# Patient Record
Sex: MALE | Race: WHITE | NOT HISPANIC OR LATINO | Employment: FULL TIME | URBAN - METROPOLITAN AREA
[De-identification: names, ages, dates, MRNs, and addresses within clinical notes are randomized per-mention and may not be internally consistent; named-entity substitution may affect disease eponyms.]

---

## 2018-12-17 ENCOUNTER — OFFICE VISIT (OUTPATIENT)
Dept: URGENT CARE | Facility: CLINIC | Age: 23
End: 2018-12-17
Payer: COMMERCIAL

## 2018-12-17 VITALS
HEART RATE: 60 BPM | DIASTOLIC BLOOD PRESSURE: 70 MMHG | SYSTOLIC BLOOD PRESSURE: 119 MMHG | WEIGHT: 120 LBS | TEMPERATURE: 98 F | RESPIRATION RATE: 18 BRPM | OXYGEN SATURATION: 99 % | BODY MASS INDEX: 19.29 KG/M2 | HEIGHT: 66 IN
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
## 2018-12-17 PROCEDURE — 90715 TDAP VACCINE 7 YRS/> IM: CPT | Mod: S$GLB,,, | Performed by: PHYSICIAN ASSISTANT

## 2018-12-17 PROCEDURE — 90471 IMMUNIZATION ADMIN: CPT | Mod: S$GLB,,, | Performed by: PHYSICIAN ASSISTANT

## 2018-12-17 PROCEDURE — 99204 OFFICE O/P NEW MOD 45 MIN: CPT | Mod: 25,S$GLB,, | Performed by: PHYSICIAN ASSISTANT

## 2018-12-17 RX ORDER — AMOXICILLIN AND CLAVULANATE POTASSIUM 875; 125 MG/1; MG/1
1 TABLET, FILM COATED ORAL 2 TIMES DAILY
Qty: 20 TABLET | Refills: 0 | Status: SHIPPED | OUTPATIENT
Start: 2018-12-17 | End: 2018-12-27

## 2018-12-17 RX ORDER — MUPIROCIN 20 MG/G
OINTMENT TOPICAL
Qty: 22 G | Refills: 1 | Status: SHIPPED | OUTPATIENT
Start: 2018-12-17 | End: 2020-11-30

## 2018-12-18 NOTE — PATIENT INSTRUCTIONS
· Follow up with your primary care in 2-5 days if symptoms have not improved, or you may return here.  · If you were referred to a specialist, please follow up with that specialty.  · If you were prescribed antibiotics, please take them to completion.  · If you were prescribed a narcotic or any medication with sedative effects, do not drive or operate heavy equipment or machinery while taking these medications.  · You must understand that you have received treatment at an Urgent Care facility only, and that you may be released before all of your medical problems are known or treated. Urgent Care facilities are not equipped to handle life threatening emergencies. It is recommended that you go to an Emergency Department for further evaluation of worsening or concerning symptoms, or possibly life threatening conditions as discussed.                                        If you  smoke, please stop smoking        Cat Bite    A cat bite can cause a wound deep enough to break the skin. In such cases, the wound is cleaned and then sometimes closed. If the wound is closed it is usually not closed completely. This is so that fluid can drain if the wound becomes infected. Often the wound is left open to heal. In addition to wound care, a tetanus shot may be given, if needed.  Home care  · Wash your hands well with soap and warm water before and after caring for the wound. This helps lower the risk of infection.  · Care for the wound as directed. If a dressing was applied to the wound, be sure to change it as directed.  · If the wound bleeds, place a clean, soft cloth on the wound. Then firmly apply pressure until the bleeding stops. This may take up to 5 minutes. Do not release the pressure and look at the wound during this time.  · Always get medical attention for cat bites on the hand. They are highly likely to become infected.  · Most wounds heal within 10 days. But an infection can occur even with proper treatment. So be  sure to check the wound daily for signs of infection (see below).  · Antibiotics may be prescribed. These help prevent or treat infection. If youre given antibiotics, take them as directed. Also be sure to complete the medicines.  Rabies prevention  Rabies is a virus that can be carried in certain animals. These can include domestic animals such as cats and dogs. Pets fully vaccinated against rabies (2 shots) are at very low risk of infection. But because human rabies is almost always fatal, any biting pet should be confined for 10 days as an extra precaution. In general, if there is a risk for rabies, the following steps may need to be taken:  · If someones pet cat has bitten you, it should be kept in a secure area for the next 10 days to watch for signs of illness. (If the pet owner wont allow this, contact your local animal control center.) If the cat becomes ill or dies during that time, contact your local animal control center at once so the animal may be tested for rabies. If the cat stays healthy for the next 10 days, there is no danger of rabies in the animal or you.  · If a stray cat bit you, contact your local animal control center. They can give information on capture, quarantine, and animal rabies testing.  · If you cant find the animal that bit you in the next 2 days, and if rabies exists in your area, you may need to receive the rabies vaccine series. Call your healthcare provider right away. Or return to the emergency department promptly.  · All animal bites should be reported to the local animal control center. If you were not given a form to fill out, you can report this yourself.  Follow-up care  Follow up with your healthcare provider, or as directed.  When to seek medical advice  Call your healthcare provider right away if any of these occur:  · Signs of infection:  ¨ Spreading redness or warmth from the wound  ¨ Increased pain or swelling  ¨ Fever of 100.4ºF (38ºC) or higher, or as directed  by your healthcare provider  ¨ Colored fluid or pus draining from the wound  ¨ Enlarged lymph nodes above the area that was bitten, such as lymph nodes in the armpit if you were bitten on the hand or arm. This may be a sign of cat-scratch disease (cat-scratch fever).  · Signs of rabies infection:  ¨ Headache  ¨ Confusion  ¨ Strange behavior  ¨ Increased salivating or drooling  ¨ Seizure  · Decreased ability to move any body part near the bite area  · Bleeding that can't be stopped after 5 minutes of firm pressure  Date Last Reviewed: 3/23/2015  © 6194-6937 [employer]. [address] [place] [address], [place] [address]. All rights reserved. This information is not intended as a substitute for professional medical care. Always follow your healthcare professional's instructions.

## 2018-12-18 NOTE — PROGRESS NOTES
"Subjective:       Patient ID: Burton Alvarado is a 23 y.o. male.    Vitals:  height is 5' 6" (1.676 m) and weight is 54.4 kg (120 lb). His oral temperature is 98 °F (36.7 °C). His blood pressure is 119/70 and his pulse is 60. His respiration is 18 and oxygen saturation is 99%.     Chief Complaint: Animal Bite (right hand)    Patient was bitten by a stray cat (R hand) while at work.      Animal Bite    The incident occurred today. The incident occurred at work. He came to the ER via personal transport. There is an injury to the right hand. There is an injury to the right little finger. The pain is mild. It is unlikely that a foreign body is present. There have been no prior injuries to these areas. He is right-handed. His tetanus status is unknown. He has been behaving normally.      <OUCOOHADULT>    Objective:      Physical Exam   Constitutional: He is oriented to person, place, and time. He appears well-developed and well-nourished. He is cooperative.  Non-toxic appearance. He does not appear ill. No distress.   HENT:   Head: Normocephalic and atraumatic.   Right Ear: Hearing, tympanic membrane, external ear and ear canal normal.   Left Ear: Hearing, tympanic membrane, external ear and ear canal normal.   Nose: Nose normal. No mucosal edema, rhinorrhea or nasal deformity. No epistaxis. Right sinus exhibits no maxillary sinus tenderness and no frontal sinus tenderness. Left sinus exhibits no maxillary sinus tenderness and no frontal sinus tenderness.   Mouth/Throat: Uvula is midline, oropharynx is clear and moist and mucous membranes are normal. No trismus in the jaw. Normal dentition. No uvula swelling. No posterior oropharyngeal erythema.   Eyes: Conjunctivae and lids are normal. Right eye exhibits no discharge. Left eye exhibits no discharge. No scleral icterus.   Sclera clear bilat   Neck: Trachea normal, normal range of motion, full passive range of motion without pain and phonation normal. Neck supple. "   Cardiovascular: Normal rate, regular rhythm, normal heart sounds, intact distal pulses and normal pulses.   Pulmonary/Chest: Effort normal and breath sounds normal. No respiratory distress.   Abdominal: Soft. Normal appearance and bowel sounds are normal. He exhibits no distension, no pulsatile midline mass and no mass. There is no tenderness.   Musculoskeletal: Normal range of motion. He exhibits no edema or deformity.        Right hand: He exhibits tenderness. He exhibits normal capillary refill, no laceration and no swelling. Normal sensation noted.        Hands:  Neurological: He is alert and oriented to person, place, and time. He exhibits normal muscle tone. Coordination normal.   Skin: Skin is warm, dry and intact. He is not diaphoretic. No pallor.   Psychiatric: He has a normal mood and affect. His speech is normal and behavior is normal. Judgment and thought content normal. Cognition and memory are normal.   Nursing note and vitals reviewed.      Assessment:       1. Cat bite, initial encounter        Plan:       - Animal control contacted    Cat bite, initial encounter  -     mupirocin (BACTROBAN) 2 % ointment; Apply to affected area 2 times daily  Dispense: 22 g; Refill: 1  -     amoxicillin-clavulanate 875-125mg (AUGMENTIN) 875-125 mg per tablet; Take 1 tablet by mouth 2 (two) times daily. for 10 days  Dispense: 20 tablet; Refill: 0  -     (In Office Administered) Tdap Vaccine      Patient Instructions   · Follow up with your primary care in 2-5 days if symptoms have not improved, or you may return here.  · If you were referred to a specialist, please follow up with that specialty.  · If you were prescribed antibiotics, please take them to completion.  · If you were prescribed a narcotic or any medication with sedative effects, do not drive or operate heavy equipment or machinery while taking these medications.  · You must understand that you have received treatment at an Urgent Care facility only, and  that you may be released before all of your medical problems are known or treated. Urgent Care facilities are not equipped to handle life threatening emergencies. It is recommended that you go to an Emergency Department for further evaluation of worsening or concerning symptoms, or possibly life threatening conditions as discussed.                                        If you  smoke, please stop smoking        Cat Bite    A cat bite can cause a wound deep enough to break the skin. In such cases, the wound is cleaned and then sometimes closed. If the wound is closed it is usually not closed completely. This is so that fluid can drain if the wound becomes infected. Often the wound is left open to heal. In addition to wound care, a tetanus shot may be given, if needed.  Home care  · Wash your hands well with soap and warm water before and after caring for the wound. This helps lower the risk of infection.  · Care for the wound as directed. If a dressing was applied to the wound, be sure to change it as directed.  · If the wound bleeds, place a clean, soft cloth on the wound. Then firmly apply pressure until the bleeding stops. This may take up to 5 minutes. Do not release the pressure and look at the wound during this time.  · Always get medical attention for cat bites on the hand. They are highly likely to become infected.  · Most wounds heal within 10 days. But an infection can occur even with proper treatment. So be sure to check the wound daily for signs of infection (see below).  · Antibiotics may be prescribed. These help prevent or treat infection. If youre given antibiotics, take them as directed. Also be sure to complete the medicines.  Rabies prevention  Rabies is a virus that can be carried in certain animals. These can include domestic animals such as cats and dogs. Pets fully vaccinated against rabies (2 shots) are at very low risk of infection. But because human rabies is almost always fatal, any biting  pet should be confined for 10 days as an extra precaution. In general, if there is a risk for rabies, the following steps may need to be taken:  · If someones pet cat has bitten you, it should be kept in a secure area for the next 10 days to watch for signs of illness. (If the pet owner wont allow this, contact your local animal control center.) If the cat becomes ill or dies during that time, contact your local animal control center at once so the animal may be tested for rabies. If the cat stays healthy for the next 10 days, there is no danger of rabies in the animal or you.  · If a stray cat bit you, contact your local animal control center. They can give information on capture, quarantine, and animal rabies testing.  · If you cant find the animal that bit you in the next 2 days, and if rabies exists in your area, you may need to receive the rabies vaccine series. Call your healthcare provider right away. Or return to the emergency department promptly.  · All animal bites should be reported to the local animal control center. If you were not given a form to fill out, you can report this yourself.  Follow-up care  Follow up with your healthcare provider, or as directed.  When to seek medical advice  Call your healthcare provider right away if any of these occur:  · Signs of infection:  ¨ Spreading redness or warmth from the wound  ¨ Increased pain or swelling  ¨ Fever of 100.4ºF (38ºC) or higher, or as directed by your healthcare provider  ¨ Colored fluid or pus draining from the wound  ¨ Enlarged lymph nodes above the area that was bitten, such as lymph nodes in the armpit if you were bitten on the hand or arm. This may be a sign of cat-scratch disease (cat-scratch fever).  · Signs of rabies infection:  ¨ Headache  ¨ Confusion  ¨ Strange behavior  ¨ Increased salivating or drooling  ¨ Seizure  · Decreased ability to move any body part near the bite area  · Bleeding that can't be stopped after 5 minutes of firm  pressure  Date Last Reviewed: 3/23/2015  © 8996-2331 [employer]. [address] [place] [address], [place] [address]. All rights reserved. This information is not intended as a substitute for professional medical care. Always follow your healthcare professional's instructions.

## 2020-11-19 ENCOUNTER — OFFICE VISIT (OUTPATIENT)
Dept: URGENT CARE | Facility: CLINIC | Age: 25
End: 2020-11-19
Payer: OTHER MISCELLANEOUS

## 2020-11-19 VITALS
HEART RATE: 88 BPM | SYSTOLIC BLOOD PRESSURE: 132 MMHG | RESPIRATION RATE: 17 BRPM | DIASTOLIC BLOOD PRESSURE: 86 MMHG | WEIGHT: 130 LBS | HEIGHT: 66 IN | TEMPERATURE: 99 F | OXYGEN SATURATION: 99 % | BODY MASS INDEX: 20.89 KG/M2

## 2020-11-19 DIAGNOSIS — M79.651 RIGHT THIGH PAIN: ICD-10-CM
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
## 2020-11-19 DIAGNOSIS — M25.552 LEFT HIP PAIN: ICD-10-CM

## 2020-11-19 DIAGNOSIS — S32.591A CLOSED FRACTURE OF RAMUS OF RIGHT PUBIS, INITIAL ENCOUNTER: Primary | ICD-10-CM

## 2020-11-19 LAB
CTP QC/QA: YES
POC 5 PANEL DRUG SCREEN: NEGATIVE

## 2020-11-19 PROCEDURE — 80305 DRUG TEST PRSMV DIR OPT OBS: CPT | Mod: QW,S$GLB,, | Performed by: NURSE PRACTITIONER

## 2020-11-19 PROCEDURE — 99214 PR OFFICE/OUTPT VISIT, EST, LEVL IV, 30-39 MIN: ICD-10-PCS | Mod: S$GLB,,, | Performed by: NURSE PRACTITIONER

## 2020-11-19 PROCEDURE — 73552 X-RAY EXAM OF FEMUR 2/>: CPT | Mod: RT,S$GLB,, | Performed by: RADIOLOGY

## 2020-11-19 PROCEDURE — 73552 XR FEMUR 2 VIEW RIGHT: ICD-10-PCS | Mod: RT,S$GLB,, | Performed by: RADIOLOGY

## 2020-11-19 PROCEDURE — 99214 OFFICE O/P EST MOD 30 MIN: CPT | Mod: S$GLB,,, | Performed by: NURSE PRACTITIONER

## 2020-11-19 PROCEDURE — 73502 XR HIP 2 VIEW LEFT: ICD-10-PCS | Mod: LT,S$GLB,, | Performed by: RADIOLOGY

## 2020-11-19 PROCEDURE — 73502 X-RAY EXAM HIP UNI 2-3 VIEWS: CPT | Mod: LT,S$GLB,, | Performed by: RADIOLOGY

## 2020-11-19 PROCEDURE — 80305 POCT RAPID DRUG SCREEN 5 PANEL: ICD-10-PCS | Mod: QW,S$GLB,, | Performed by: NURSE PRACTITIONER

## 2020-11-19 RX ORDER — IBUPROFEN 800 MG/1
800 TABLET ORAL 3 TIMES DAILY
Qty: 15 TABLET | Refills: 0 | Status: SHIPPED | OUTPATIENT
Start: 2020-11-19 | End: 2020-11-24

## 2020-11-19 NOTE — PROGRESS NOTES
"Subjective:       Patient ID: Burton Alvarado is a 25 y.o. male.    Vitals:  height is 5' 6" (1.676 m) and weight is 59 kg (130 lb). His temperature is 98.7 °F (37.1 °C). His blood pressure is 132/86 and his pulse is 88. His respiration is 17 and oxygen saturation is 99%.     Chief Complaint: Injury    Patient reports he fell off a scaffold about 10-15ft in the air. Pt reports he fell onto his left hip, denies head injury and LOC. Pt also reports after falling to the cemented ground, the scaffold fell onto his right thigh.     Injury  This is a new problem. The current episode started today. The problem occurs constantly. The problem has been rapidly worsening. Associated symptoms include arthralgias and joint swelling. Pertinent negatives include no abdominal pain, chills, diaphoresis, fatigue, headaches, neck pain or vertigo. The symptoms are aggravated by walking and bending. He has tried NSAIDs for the symptoms. The treatment provided mild relief.       Constitution: Negative for activity change, appetite change, chills, sweating and fatigue.   HENT: Negative for facial swelling and facial trauma.    Neck: Negative for neck pain and neck stiffness.   Cardiovascular: Negative for chest trauma.   Eyes: Negative for eye trauma, double vision and blurred vision.   Gastrointestinal: Negative for abdominal trauma, abdominal pain and rectal bleeding.   Genitourinary: Negative for hematuria, genital trauma and pelvic pain.   Musculoskeletal: Positive for pain (right hip and leg ), trauma, joint pain, joint swelling, abnormal ROM of joint and pain with walking. Negative for arthritis and gout.   Skin: Negative for color change, wound, abrasion and laceration.   Allergic/Immunologic: Negative for immunizations up-to-date and flu shot.   Neurological: Negative for dizziness, history of vertigo, light-headedness, passing out, facial drooping, speech difficulty, coordination disturbances, loss of balance, headaches, history " of migraines, disorientation, altered mental status and loss of consciousness.   Hematologic/Lymphatic: Negative for history of bleeding disorder.   Psychiatric/Behavioral: Negative for altered mental status and disorientation.       Objective:      Physical Exam   Constitutional:  Non-toxic appearance. He does not appear ill. No distress.   HENT:   Head: Normocephalic.   Ears:   Right Ear: Tympanic membrane, external ear and ear canal normal.   Left Ear: Tympanic membrane, external ear and ear canal normal.   Nose: Nose normal.   Mouth/Throat: No posterior oropharyngeal erythema.   Eyes: No scleral icterus.   Neck: Normal range of motion. No neck rigidity.   Cardiovascular: Normal rate, regular rhythm, normal heart sounds and normal pulses.   Pulmonary/Chest: Effort normal and breath sounds normal. No accessory muscle usage. No respiratory distress. He has no decreased breath sounds. He has no wheezes. He has no rhonchi.   Abdominal: Normal appearance and bowel sounds are normal. He exhibits no distension. There is no abdominal tenderness. There is no guarding, no left CVA tenderness and no right CVA tenderness. flat abdomen  Musculoskeletal:         General: Swelling and tenderness present.      Left hip: He exhibits tenderness. He exhibits no bony tenderness.        Legs:    Lymphadenopathy:     He has no cervical adenopathy.   Neurological: He is alert. He displays no weakness. Gait (pt ambulating slowly) abnormal.   Skin: Skin is not diaphoretic. Capillary refill takes less than 2 seconds. Lesions:  bruisingPsychiatric: His behavior is normal. Judgment and thought content normal.   Nursing note and vitals reviewed.        Assessment:       1. Closed fracture of ramus of right pubis, initial encounter    2. Left hip pain    3. Right thigh pain    4. Fall, initial encounter        Plan:         Closed fracture of ramus of right pubis, initial encounter  -     Ambulatory referral/consult to Orthopedics  -      ibuprofen (ADVIL,MOTRIN) 800 MG tablet; Take 1 tablet (800 mg total) by mouth 3 (three) times daily. for 5 days  Dispense: 15 tablet; Refill: 0    Left hip pain  -     XR HIP 2 VIEW LEFT; Future; Expected date: 11/19/2020  -     Ambulatory referral/consult to Occupational Medicine  -     ibuprofen (ADVIL,MOTRIN) 800 MG tablet; Take 1 tablet (800 mg total) by mouth 3 (three) times daily. for 5 days  Dispense: 15 tablet; Refill: 0    Right thigh pain  -     XR FEMUR 2 VIEW RIGHT; Future; Expected date: 11/19/2020  -     Ambulatory referral/consult to Occupational Medicine  -     ibuprofen (ADVIL,MOTRIN) 800 MG tablet; Take 1 tablet (800 mg total) by mouth 3 (three) times daily. for 5 days  Dispense: 15 tablet; Refill: 0    Fall, initial encounter  -     XR HIP 2 VIEW LEFT; Future; Expected date: 11/19/2020  -     XR FEMUR 2 VIEW RIGHT; Future; Expected date: 11/19/2020  -     POCT Rapid Drug Screen 5 Panel  -     Ambulatory referral/consult to Occupational Medicine  -     Ambulatory referral/consult to Orthopedics  -     ibuprofen (ADVIL,MOTRIN) 800 MG tablet; Take 1 tablet (800 mg total) by mouth 3 (three) times daily. for 5 days  Dispense: 15 tablet; Refill: 0    Xr Hip 2 View Left    Result Date: 11/19/2020  EXAMINATION: XR HIP 2 VIEW LEFT CLINICAL HISTORY: Pain in left hip TECHNIQUE: AP view of the pelvis and frog leg lateral view of the left hip were performed. COMPARISON: None FINDINGS: There is subtle cortical irregularity which may represent acute nondisplaced fracture is seen involving the right superior pubic ramus.  Correlate for trauma and tenderness in this region.  No additional acute fractures or dislocation seen.  Joint spaces are maintained.     Suspected acute fracture of the right superior pubic ramus. Electronically signed by: Janusz Valverde MD Date:    11/19/2020 Time:    18:21    Xr Femur 2 View Right    Result Date: 11/19/2020  EXAMINATION: XR FEMUR 2 VIEW RIGHT CLINICAL HISTORY: Pain in right  thigh TECHNIQUE: AP and lateral views of the right femur were performed. COMPARISON: Concurrent pelvic and left hip radiographs. FINDINGS: No evidence of acute displaced fracture or dislocation involving the right femur.  Questionable fracture of the right superior pubic ramus better appreciated on concurrent pelvic/left hip radiograph.     See above. Electronically signed by: Janusz Valverde MD Date:    11/19/2020 Time:    18:22        Patient Instructions   1.  Take all medications as directed. If you have been prescribed antibiotics, make sure to complete them.   2.  Rest and keep yourself/patient well hydrated. For adults, it is recommended to drink at least 8-10 glasses of water daily.   3.  For patients above 6 months of age who are not allergic to and are not on anticoagulants, you can alternate Tylenol and Motrin every 4-6 hours for fever above 100.4F and/or pain.  For patients less than 6 months of age, allergic to or intolerant to NSAIDS, have gastritis, gastric ulcers, or history of GI bleeds, are pregnant, or are on anticoagulant therapy, you can take Tylenol every 4 hours as needed for fever above 100.4F and/or pain.   4. You should schedule a follow-up appointment with your Primary Care Provider/Pediatrician for recheck in 2-3 days or as directed at this visit.   5.  If your condition fails to improve in a timely manner, you should receive another evaluation by your Primary Care Provider/Pediatrician to discuss your concerns or return to urgent care for a recheck.  If your condition worsens at any time, you should report immediately to your nearest Emergency Department for further evaluation. **You must understand that you have received Urgent Care treatment only and that you may be released before all of your medical problems are known or treated. You, the patient, are responsible to arrange for follow-up care as instructed.         Pelvic Fracture  You have a break or fracture of the pelvic bone. Your  fracture is stable because the bones are not out of place and there are no signs of serious internal bleeding. No surgery or other special treatment will be needed. As long as your pain is controlled by oral medicine, you can be treated at home. A broken pelvis will take about 6 to 8 weeks to heal. It can be painful to move for the first 3 to 4 weeks.     Home care  · Bed rest and pain medicine is the only treatment required. Stay in bed for the first 2 to 3 days to reduce pain with movement. During this time, you will need help bathing, using the bathroom, and meals. A bedpan or bedside commode may be easier to use than getting up to use the bathroom. As soon as possible, begin sitting or walking to avoid problems with prolonged bed rest (muscle weakness, worsening back stiffness and pain, blood clots in the legs). A walker, crutches, or cane will make walking easier in the first 3 weeks.  · Home healthcare may be available to provide in-home nursing services. Check with your healthcare provider, the [facility] social service department or a private nursing agency to see if your insurance will cover this kind of care.  · During the first 2 days after the injury there will probably be localized swelling and bruising on the skin over the pelvis. During this time apply an ice pack to the painful area for no more than 20 minutes every 1 to 2 hours to reduce swelling and pain. Wrap the ice pack in a thin towel. Never put ice or an ice pack directly on your skin.  · You may use over-the-counter pain medicine to control pain, unless another medicine was prescribed. Take pain medicine as directed. Call your healthcare provider if your pain is not well-controlled. A dose change or stronger medicine may be needed. If you have chronic liver or kidney disease, talk with your healthcare provider before using pain medicine.  Follow-up care  Follow up with your healthcare provider, or as advised. This will help to make sure the  bone is healing properly.  If X-rays were taken, you will be told of any new findings that may affect your care.  Call 911  Call 911 if you have:  · Swelling, pain or redness below your knee  · Chest pain or shortness of breath  When to seek medical advice  Call your healthcare provider right away if any of these occur:  · Pain becomes worse or you are unable to walk with help for more than three days  · Blood in your urine or bleeding from the urethra (the opening where urine comes out)  · Difficulty passing urine or unable to pass stool due to pain  · Fever of 100.4°F (38°C) or above lasting for 24 to 48 hours  Date Last Reviewed: 12/3/2015  © 8570-6258 [employer]. [address] [place] [address], [place] [address]. All rights reserved. This information is not intended as a substitute for professional medical care. Always follow your healthcare professional's instructions.

## 2020-11-20 ENCOUNTER — OFFICE VISIT (OUTPATIENT)
Dept: URGENT CARE | Facility: CLINIC | Age: 25
End: 2020-11-20
Payer: OTHER MISCELLANEOUS

## 2020-11-20 DIAGNOSIS — S32.511A CLOSED FRACTURE OF SUPERIOR RAMUS OF RIGHT PUBIS, INITIAL ENCOUNTER: Primary | ICD-10-CM

## 2020-11-20 DIAGNOSIS — R10.2 PELVIC PAIN: ICD-10-CM

## 2020-11-20 DIAGNOSIS — S70.11XA CONTUSION OF RIGHT THIGH, INITIAL ENCOUNTER: ICD-10-CM

## 2020-11-20 DIAGNOSIS — Z72.0 TOBACCO USE: ICD-10-CM
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
## 2020-11-20 PROCEDURE — 99214 OFFICE O/P EST MOD 30 MIN: CPT | Mod: S$GLB,,, | Performed by: EMERGENCY MEDICINE

## 2020-11-20 PROCEDURE — 99214 PR OFFICE/OUTPT VISIT, EST, LEVL IV, 30-39 MIN: ICD-10-PCS | Mod: S$GLB,,, | Performed by: EMERGENCY MEDICINE

## 2020-11-20 RX ORDER — HYDROCODONE BITARTRATE AND ACETAMINOPHEN 5; 325 MG/1; MG/1
1 TABLET ORAL EVERY 8 HOURS PRN
Qty: 21 TABLET | Refills: 0 | Status: SHIPPED | OUTPATIENT
Start: 2020-11-20 | End: 2020-11-27

## 2020-11-20 NOTE — LETTER
[facility]  [address] [address]  Phone: [phone]  Fax: [phone]  [facility]: [id number]    Pt Name: Burton Alvarado  Injury Date: 11/19/2020   Employee ID: [id number] Date of First Treatment: 11/20/2020   Company: [employer]      Appointment Time: 01:25 PM Arrived: 1:10 PM   Provider: Sony Bonilla MD Time Out: 3:10 PM     Office Treatment:     1. Closed fracture of superior ramus of right pubis, initial encounter    2. Pelvic pain    3. Contusion of right thigh, initial encounter    4. Tobacco use    5. Fall, initial encounter    6. Work related injury      Medications Ordered This Encounter   Medications    HYDROcodone-acetaminophen (NORCO) 5-325 mg per tablet      Patient Instructions: Attention not to aggravate affected area, Apply ice 24-48 hours then apply heat/warm soaks, Use Crutches as directed    Restrictions: Disabled until next office visit     Return Appointment: 11/27/2020 at 9:30 AM  JOSHUA

## 2020-11-20 NOTE — PATIENT INSTRUCTIONS
Pelvic Fracture  You have a break or fracture of the pelvic bone. Your fracture is stable because the bones are not out of place and there are no signs of serious internal bleeding. No surgery or other special treatment will be needed. As long as your pain is controlled by oral medicine, you can be treated at home. A broken pelvis will take about 6 to 8 weeks to heal. It can be painful to move for the first 3 to 4 weeks.     Home care  · Bed rest and pain medicine is the only treatment required. Stay in bed for the first 2 to 3 days to reduce pain with movement. During this time, you will need help bathing, using the bathroom, and meals. A bedpan or bedside commode may be easier to use than getting up to use the bathroom. As soon as possible, begin sitting or walking to avoid problems with prolonged bed rest (muscle weakness, worsening back stiffness and pain, blood clots in the legs). A walker, crutches, or cane will make walking easier in the first 3 weeks.  · Home healthcare may be available to provide in-home nursing services. Check with your healthcare provider, the [facility] social service department or a private nursing agency to see if your insurance will cover this kind of care.  · During the first 2 days after the injury there will probably be localized swelling and bruising on the skin over the pelvis. During this time apply an ice pack to the painful area for no more than 20 minutes every 1 to 2 hours to reduce swelling and pain. Wrap the ice pack in a thin towel. Never put ice or an ice pack directly on your skin.  · You may use over-the-counter pain medicine to control pain, unless another medicine was prescribed. Take pain medicine as directed. Call your healthcare provider if your pain is not well-controlled. A dose change or stronger medicine may be needed. If you have chronic liver or kidney disease, talk with your healthcare provider before using pain medicine.  Follow-up care  Follow up with  your healthcare provider, or as advised. This will help to make sure the bone is healing properly.  If X-rays were taken, you will be told of any new findings that may affect your care.  Call 911  Call 911 if you have:  · Swelling, pain or redness below your knee  · Chest pain or shortness of breath  When to seek medical advice  Call your healthcare provider right away if any of these occur:  · Pain becomes worse or you are unable to walk with help for more than three days  · Blood in your urine or bleeding from the urethra (the opening where urine comes out)  · Difficulty passing urine or unable to pass stool due to pain  · Fever of 100.4°F (38°C) or above lasting for 24 to 48 hours  Date Last Reviewed: 12/3/2015  © 6579-0776 The "[employer]. [address] [place] [address], [place] [address]. All rights reserved. This information is not intended as a substitute for professional medical care. Always follow your healthcare professional's instructions.

## 2020-11-20 NOTE — PATIENT INSTRUCTIONS
1.  Take all medications as directed. If you have been prescribed antibiotics, make sure to complete them.   2.  Rest and keep yourself/patient well hydrated. For adults, it is recommended to drink at least 8-10 glasses of water daily.   3.  For patients above 6 months of age who are not allergic to and are not on anticoagulants, you can alternate Tylenol and Motrin every 4-6 hours for fever above 100.4F and/or pain.  For patients less than 6 months of age, allergic to or intolerant to NSAIDS, have gastritis, gastric ulcers, or history of GI bleeds, are pregnant, or are on anticoagulant therapy, you can take Tylenol every 4 hours as needed for fever above 100.4F and/or pain.   4. You should schedule a follow-up appointment with your Primary Care Provider/Pediatrician for recheck in 2-3 days or as directed at this visit.   5.  If your condition fails to improve in a timely manner, you should receive another evaluation by your Primary Care Provider/Pediatrician to discuss your concerns or return to urgent care for a recheck.  If your condition worsens at any time, you should report immediately to your nearest Emergency Department for further evaluation. **You must understand that you have received Urgent Care treatment only and that you may be released before all of your medical problems are known or treated. You, the patient, are responsible to arrange for follow-up care as instructed.         Pelvic Fracture  You have a break or fracture of the pelvic bone. Your fracture is stable because the bones are not out of place and there are no signs of serious internal bleeding. No surgery or other special treatment will be needed. As long as your pain is controlled by oral medicine, you can be treated at home. A broken pelvis will take about 6 to 8 weeks to heal. It can be painful to move for the first 3 to 4 weeks.     Home care  · Bed rest and pain medicine is the only treatment required. Stay in bed for the first 2 to 3  days to reduce pain with movement. During this time, you will need help bathing, using the bathroom, and meals. A bedpan or bedside commode may be easier to use than getting up to use the bathroom. As soon as possible, begin sitting or walking to avoid problems with prolonged bed rest (muscle weakness, worsening back stiffness and pain, blood clots in the legs). A walker, crutches, or cane will make walking easier in the first 3 weeks.  · Home healthcare may be available to provide in-home nursing services. Check with your healthcare provider, the [facility] social service department or a private nursing agency to see if your insurance will cover this kind of care.  · During the first 2 days after the injury there will probably be localized swelling and bruising on the skin over the pelvis. During this time apply an ice pack to the painful area for no more than 20 minutes every 1 to 2 hours to reduce swelling and pain. Wrap the ice pack in a thin towel. Never put ice or an ice pack directly on your skin.  · You may use over-the-counter pain medicine to control pain, unless another medicine was prescribed. Take pain medicine as directed. Call your healthcare provider if your pain is not well-controlled. A dose change or stronger medicine may be needed. If you have chronic liver or kidney disease, talk with your healthcare provider before using pain medicine.  Follow-up care  Follow up with your healthcare provider, or as advised. This will help to make sure the bone is healing properly.  If X-rays were taken, you will be told of any new findings that may affect your care.  Call 911  Call 911 if you have:  · Swelling, pain or redness below your knee  · Chest pain or shortness of breath  When to seek medical advice  Call your healthcare provider right away if any of these occur:  · Pain becomes worse or you are unable to walk with help for more than three days  · Blood in your urine or bleeding from the urethra (the  opening where urine comes out)  · Difficulty passing urine or unable to pass stool due to pain  · Fever of 100.4°F (38°C) or above lasting for 24 to 48 hours  Date Last Reviewed: 12/3/2015  © 4150-6560 [employer] [address] [place] [address], [place] [address]. All rights reserved. This information is not intended as a substitute for professional medical care. Always follow your healthcare professional's instructions.

## 2020-11-20 NOTE — PROGRESS NOTES
Subjective:       Patient ID: Burton Alvarado is a 25 y.o. male.    Chief Complaint: Hip Injury    Patient works for [employer] as a . Pt states he was at work on 11/19/2020 working on a roof when he fell off a scaffold about 10-15ft in the air. Pt reports he fell onto his LEFT SIDE and the scaffold felt  onto his RT THIGH. He denies any head injury. Pt also reports after the injury he was seen at [facility] where he received x-rays and medication. His pain today is a 7/10 and the pain is mainly on his RT THIGH. ij     I have reviewed the medical record and discussed the history of injury with the patient.  X-ray of the right femur is negative for fracture and x-ray of the hip bilaterally is negative however there is a nondisplaced right-sided superior pubic ramus fracture.  No bowel or bladder incontinence no retention, no weakness in the legs, patient is pain-free when at rest however and severe pain with axial loading or ambulation.  I have discussed at length with the patient that this the vast majority of the time is a nonsurgical approach, specifically pain control, weight-bearing only as tolerated with the use of crutches, and that we would see him weekly to make sure that no other sort of referral to orthopedist or physical therapy with the needed secondary to his response to treatment.  He currently is taking ibuprofen which takes the edge off however his injury was yesterday and I will prescribe pain medication for short-term pain control.  No weakness numbness or tingling of the legs.    Hip Injury  This is a new problem. The current episode started yesterday. The problem occurs constantly. The problem has been unchanged. Associated symptoms include joint swelling. Pertinent negatives include no abdominal pain, change in bowel habit, congestion, neck pain, swollen glands or visual change. The symptoms are aggravated by bending, walking, twisting, standing and exertion. He has tried NSAIDs for  the symptoms. The treatment provided mild relief.       Constitution: Negative for activity change and appetite change.   HENT: Negative for facial swelling, facial trauma and congestion.    Neck: Negative for neck pain and neck stiffness.   Cardiovascular: Negative for chest trauma.   Eyes: Negative for eye trauma, double vision and blurred vision.   Gastrointestinal: Negative for abdominal trauma, abdominal pain and rectal bleeding.   Genitourinary: Negative for hematuria, genital trauma and pelvic pain.   Musculoskeletal: Positive for pain (right hip and leg ), trauma, joint swelling, abnormal ROM of joint and pain with walking. Negative for arthritis and gout.   Skin: Negative for color change, wound, abrasion and laceration.   Allergic/Immunologic: Negative for immunizations up-to-date and flu shot.   Neurological: Negative for dizziness, light-headedness, passing out, facial drooping, speech difficulty, coordination disturbances, loss of balance, history of migraines, disorientation, altered mental status and loss of consciousness.   Hematologic/Lymphatic: Negative for history of bleeding disorder.   Psychiatric/Behavioral: Negative for altered mental status and disorientation.        Objective:      Physical Exam  Vitals signs and nursing note reviewed.   Constitutional:       General: He is not in acute distress.     Appearance: Normal appearance. He is well-developed. He is not diaphoretic.   HENT:      Head: Normocephalic and atraumatic.      Nose: Nose normal.   Eyes:      General: Lids are normal.      Conjunctiva/sclera: Conjunctivae normal.   Neck:      Musculoskeletal: Full passive range of motion without pain, normal range of motion and neck supple.      Trachea: Trachea and phonation normal.   Cardiovascular:      Rate and Rhythm: Normal rate and regular rhythm.      Pulses: Normal pulses.      Heart sounds: Normal heart sounds.   Pulmonary:      Effort: Pulmonary effort is normal.      Breath  sounds: Normal breath sounds.   Abdominal:      General: Bowel sounds are normal. There is no abdominal bruit.      Palpations: Abdomen is soft. There is no mass or pulsatile mass.   Musculoskeletal:         General: No deformity.      Right hip: He exhibits tenderness and bony tenderness. He exhibits normal range of motion, normal strength, no swelling, no crepitus, no deformity and no laceration.        Legs:    Skin:     General: Skin is warm and dry.   Neurological:      Mental Status: He is alert and oriented to person, place, and time.      Sensory: No sensory deficit.      Deep Tendon Reflexes: Reflexes are normal and symmetric.   Psychiatric:         Speech: Speech normal.         Behavior: Behavior normal. Behavior is cooperative.         Thought Content: Thought content normal.         Judgment: Judgment normal.         Xr Hip 2 View Left    Result Date: 11/19/2020  EXAMINATION: XR HIP 2 VIEW LEFT CLINICAL HISTORY: Pain in left hip TECHNIQUE: AP view of the pelvis and frog leg lateral view of the left hip were performed. COMPARISON: None FINDINGS: There is subtle cortical irregularity which may represent acute nondisplaced fracture is seen involving the right superior pubic ramus.  Correlate for trauma and tenderness in this region.  No additional acute fractures or dislocation seen.  Joint spaces are maintained.     Suspected acute fracture of the right superior pubic ramus. Electronically signed by: Janusz Valverde MD Date:    11/19/2020 Time:    18:21    Xr Femur 2 View Right    Result Date: 11/19/2020  EXAMINATION: XR FEMUR 2 VIEW RIGHT CLINICAL HISTORY: Pain in right thigh TECHNIQUE: AP and lateral views of the right femur were performed. COMPARISON: Concurrent pelvic and left hip radiographs. FINDINGS: No evidence of acute displaced fracture or dislocation involving the right femur.  Questionable fracture of the right superior pubic ramus better appreciated on concurrent pelvic/left hip radiograph.      See above. Electronically signed by: Janusz Valverde MD Date:    11/19/2020 Time:    18:22      Assessment:       1. Closed fracture of superior ramus of right pubis, initial encounter    2. Pelvic pain    3. Contusion of right thigh, initial encounter    4. Tobacco use    5. Fall, initial encounter    6. Work related injury        Plan:       Discussed case with supervising physician within the occupational health system, and in agreement with management with occupational health and that since this in the vast majority nonsurgical and a matter of pain control and time with attention not to aggravate with the use nonweightbearing versus toe-touch weight-bearing and close follow-up, we will hold off on referral to any orthopedist or physical therapy at this time and see patient back in 1 week.  He will be kept at home off work this week, continue ibuprofen 800 t.i.d., I have added very short course of Norco for acute fractured pelvis, and he understands that this most likely will be deescalated at next visit.  Patient understands this plan and he also understands his options and he has agreed to follow-up here.  Medications Ordered This Encounter   Medications    HYDROcodone-acetaminophen (NORCO) 5-325 mg per tablet     Sig: Take 1 tablet by mouth every 8 (eight) hours as needed for Pain.     Dispense:  21 tablet     Refill:  0     Quantity prescribed more than 7 day supply? No     Patient Instructions: Attention not to aggravate affected area, Apply ice 24-48 hours then apply heat/warm soaks, Use Crutches as directed   Restrictions: Disabled until next office visit  Follow up in about 1 week (around 11/27/2020).

## 2020-11-30 ENCOUNTER — OFFICE VISIT (OUTPATIENT)
Dept: URGENT CARE | Facility: CLINIC | Age: 25
End: 2020-11-30
Payer: OTHER MISCELLANEOUS
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
## 2020-11-30 DIAGNOSIS — S70.11XD CONTUSION OF RIGHT THIGH, SUBSEQUENT ENCOUNTER: ICD-10-CM
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
## 2020-11-30 DIAGNOSIS — S32.511D CLOSED FRACTURE OF SUPERIOR RAMUS OF RIGHT PUBIS WITH ROUTINE HEALING, SUBSEQUENT ENCOUNTER: Primary | ICD-10-CM

## 2020-11-30 PROCEDURE — 99214 OFFICE O/P EST MOD 30 MIN: CPT | Mod: S$GLB,,, | Performed by: PHYSICIAN ASSISTANT

## 2020-11-30 PROCEDURE — 99214 PR OFFICE/OUTPT VISIT, EST, LEVL IV, 30-39 MIN: ICD-10-PCS | Mod: S$GLB,,, | Performed by: PHYSICIAN ASSISTANT

## 2020-11-30 RX ORDER — HYDROCODONE BITARTRATE AND ACETAMINOPHEN 5; 325 MG/1; MG/1
1 TABLET ORAL EVERY 6 HOURS PRN
COMMUNITY

## 2020-11-30 RX ORDER — IBUPROFEN 800 MG/1
800 TABLET ORAL 3 TIMES DAILY
COMMUNITY
End: 2020-11-30 | Stop reason: SDUPTHER

## 2020-11-30 RX ORDER — IBUPROFEN 800 MG/1
800 TABLET ORAL 3 TIMES DAILY
Qty: 30 TABLET | Refills: 0 | Status: SHIPPED | OUTPATIENT
Start: 2020-11-30

## 2020-11-30 NOTE — LETTER
[facility]  [address] [address]  Phone: [phone]  Fax: [phone]  [facility]: [id number]    Pt Name: Burton Alvarado  Injury Date: 11/19/2020   Employee ID: [id number] Date of Treatment: 11/30/2020   Company: [employer]      Appointment Time: 09:20 AM Arrived: 9:37am   Provider: Trae Miranda PA-C Time Out:10:35 am     Office Treatment:   1. Closed fracture of superior ramus of right pubis with routine healing, subsequent encounter    2. Contusion of right thigh, subsequent encounter    3. Fall, subsequent encounter    4. Work related injury      Medications Ordered This Encounter   Medications    ibuprofen (ADVIL,MOTRIN) 800 MG tablet      Patient Instructions: Use Crutches as directed    Restrictions: Sedentary work only, Avoid climbing/kneeling/squatting, Sit down work only     Return Appointment: 12/8/2020 at 9:00am

## 2020-11-30 NOTE — PROGRESS NOTES
Subjective:       Patient ID: Burton Alvarado is a 25 y.o. male.    Chief Complaint: Hip Injury (Right)    RV, Right hip fracture (DOI 11/19/2020) [place]- Patient states he right hip feels slightly better but still has pain and discomfort. He describes the pain as sharp/shooting with movement. He still has pain and pressure when walking and standing up. Pain level 7/10 on today. Taking Ibuprofen 800 mg and [place] daily. [place]    Hip Injury  This is a recurrent problem. The current episode started 1 to 4 weeks ago. The problem occurs intermittently. The problem has been unchanged. Associated symptoms include arthralgias. Pertinent negatives include no abdominal pain, anorexia, change in bowel habit, chest pain, chills, congestion, coughing, diaphoresis, fatigue, fever, headaches, joint swelling, myalgias, nausea, neck pain, numbness, rash, sore throat, swollen glands, urinary symptoms, vertigo, visual change, vomiting or weakness. The symptoms are aggravated by standing and walking. He has tried NSAIDs and oral narcotics for the symptoms. The treatment provided mild relief.       Constitution: Negative for chills, sweating, fatigue and fever.   HENT: Negative for facial swelling, facial trauma, congestion and sore throat.    Neck: Negative for neck pain and neck stiffness.   Cardiovascular: Negative for chest trauma and chest pain.   Eyes: Negative for eye trauma, double vision and blurred vision.   Respiratory: Negative for cough.    Gastrointestinal: Negative for abdominal trauma, abdominal pain, nausea, vomiting and rectal bleeding.   Endocrine: negative.   Genitourinary: Negative for hematuria, genital trauma and pelvic pain.   Musculoskeletal: Positive for pain, trauma, joint pain, abnormal ROM of joint and pain with walking. Negative for joint swelling, back pain and muscle ache.   Skin: Negative for color change, rash, wound, abrasion and laceration.   Allergic/Immunologic: Negative.    Neurological: Negative for  dizziness, history of vertigo, light-headedness, coordination disturbances, headaches, altered mental status, loss of consciousness and numbness.   Hematologic/Lymphatic: Negative for history of bleeding disorder.   Psychiatric/Behavioral: Negative for altered mental status.        Objective:      Physical Exam  Nursing note reviewed.   Constitutional:       General: He is not in acute distress.     Appearance: He is well-developed. He is not diaphoretic.   HENT:      Head: Normocephalic and atraumatic.      Right Ear: Hearing and external ear normal.      Left Ear: Hearing and external ear normal.      Nose: Nose normal. No nasal deformity.   Eyes:      General: Lids are normal. No scleral icterus.     Conjunctiva/sclera: Conjunctivae normal.   Neck:      Musculoskeletal: Normal range of motion.      Trachea: Trachea normal.   Cardiovascular:      Pulses: Normal pulses.   Pulmonary:      Effort: Pulmonary effort is normal. No respiratory distress.      Breath sounds: No stridor.   Musculoskeletal:      Right hip: He exhibits tenderness. He exhibits normal range of motion.      Right knee: Normal. He exhibits normal range of motion. No tenderness found.        Legs:       Comments: [place] gait favoring RLE. Pt using crutches, but can ambulate unassisted.    Skin:     General: Skin is warm and dry.      Capillary Refill: Capillary refill takes less than 2 seconds.   Neurological:      Mental Status: He is alert. He is not disoriented.      GCS: GCS eye subscore is 4. GCS verbal subscore is 5. GCS motor subscore is 6.      Sensory: No sensory deficit.   Psychiatric:         Attention and Perception: He is attentive.         Speech: Speech normal.         Behavior: Behavior normal.         Assessment:       1. Closed fracture of superior ramus of right pubis with routine healing, subsequent encounter    2. Contusion of right thigh, subsequent encounter    3. Fall, subsequent encounter    4. Work related injury         Plan:       Pt encouraged to advance to weight bearing as tolerated. I will refer to Dr. Marroquin for management. Referral to Ortho has been authorized if needed.         Medications Ordered This Encounter   Medications    ibuprofen (ADVIL,MOTRIN) 800 MG tablet     Sig: Take 1 tablet (800 mg total) by mouth 3 (three) times daily.     Dispense:  30 tablet     Refill:  0     Patient Instructions: Use Crutches as directed   Restrictions: Sedentary work only, Avoid climbing/kneeling/squatting, Sit down work only  Follow up in about 8 days (around 12/8/2020) for Dr. Marroquin.

## 2020-12-08 ENCOUNTER — OFFICE VISIT (OUTPATIENT)
Dept: URGENT CARE | Facility: CLINIC | Age: 25
End: 2020-12-08
Payer: OTHER MISCELLANEOUS

## 2020-12-08 DIAGNOSIS — S70.11XD CONTUSION OF RIGHT THIGH, SUBSEQUENT ENCOUNTER: ICD-10-CM

## 2020-12-08 DIAGNOSIS — S32.511D CLOSED FRACTURE OF SUPERIOR RAMUS OF RIGHT PUBIS WITH ROUTINE HEALING, SUBSEQUENT ENCOUNTER: Primary | ICD-10-CM

## 2020-12-08 PROCEDURE — 99214 OFFICE O/P EST MOD 30 MIN: CPT | Mod: S$GLB,,, | Performed by: PREVENTIVE MEDICINE

## 2020-12-08 PROCEDURE — 73502 X-RAY EXAM HIP UNI 2-3 VIEWS: CPT | Mod: FY,RT,S$GLB, | Performed by: RADIOLOGY

## 2020-12-08 PROCEDURE — 73502 XR HIP 2 VIEW RIGHT: ICD-10-PCS | Mod: FY,RT,S$GLB, | Performed by: RADIOLOGY

## 2020-12-08 PROCEDURE — 99214 PR OFFICE/OUTPT VISIT, EST, LEVL IV, 30-39 MIN: ICD-10-PCS | Mod: S$GLB,,, | Performed by: PREVENTIVE MEDICINE

## 2020-12-08 NOTE — LETTER
[facility] [address] [address]  Phone: [phone]  Fax: [phone]  [facility]: [id number]    Pt Name: Burton Alvarado  Injury Date: 11/19/2020   Employee ID: [id number] Date of  Treatment: 12/08/2020   Company: [employer]      Appointment Time: 08:45 AM Arrived: 9:15 AM   Provider: Rodri Marroquin MD Time Out: 11:20 AM     Office Treatment:   1. Closed fracture of superior ramus of right pubis with routine healing, subsequent encounter    2. Contusion of right thigh, subsequent encounter          Patient Instructions: Daily home exercises/warm soaks    Restrictions: No lifting/pushing/pulling more than 10 lbs, Sit or stand as needed, Avoid climbing/kneeling/squatting     Return Appointment: 12/15/2020 at 9:30 AM  [place]

## 2020-12-08 NOTE — PROGRESS NOTES
Subjective:       Patient ID: Burton Alvarado is a 25 y.o. male.    Chief Complaint: Hip Injury (Pelvis Fracture)    RV, Right hip fracture (DOI 11/19/2020) [employer]- Patient states he feels slightly better and is not on crutches anymore. He still has aching pain and discomfort when walking and sitting down. Currently taking Ibuprofen 800 mg prn. Pain level 5/10 on today. [place]    Hip Injury  This is a recurrent problem. The current episode started 1 to 4 weeks ago. The problem occurs daily. The problem has been gradually improving. Associated symptoms include arthralgias and myalgias. Pertinent negatives include no abdominal pain, anorexia, change in bowel habit, chest pain, chills, congestion, coughing, diaphoresis, fatigue, fever, headaches, joint swelling, nausea, neck pain, numbness, rash, sore throat, swollen glands, urinary symptoms, vertigo, visual change, vomiting or weakness. The symptoms are aggravated by walking (sitting). He has tried NSAIDs for the symptoms. The treatment provided moderate relief.       Constitution: Negative for chills, sweating, fatigue and fever.   HENT: Negative for facial swelling, facial trauma, congestion and sore throat.    Neck: Negative for neck pain and neck stiffness.   Cardiovascular: Negative for chest trauma and chest pain.   Eyes: Negative for eye trauma, double vision and blurred vision.   Respiratory: Negative for cough.    Gastrointestinal: Negative for abdominal trauma, abdominal pain, nausea, vomiting and rectal bleeding.   Endocrine: negative.   Genitourinary: Negative for hematuria, genital trauma and pelvic pain.   Musculoskeletal: Positive for pain, joint pain, abnormal ROM of joint, pain with walking and muscle ache. Negative for trauma and joint swelling.   Skin: Negative for color change, rash, wound, abrasion and laceration.   Allergic/Immunologic: Negative.    Neurological: Negative for dizziness, history of vertigo, light-headedness, coordination disturbances,  headaches, altered mental status, loss of consciousness and numbness.   Hematologic/Lymphatic: Negative for history of bleeding disorder.   Psychiatric/Behavioral: Negative for altered mental status.        Objective:      Physical Exam  Vitals signs and nursing note reviewed.   Constitutional:       Appearance: Normal appearance. He is well-developed.   HENT:      Head: Normocephalic and atraumatic.      Right Ear: Tympanic membrane normal.      Left Ear: Tympanic membrane normal.   Eyes:      Pupils: Pupils are equal, round, and reactive to light.   Neck:      Musculoskeletal: Normal range of motion.   Cardiovascular:      Rate and Rhythm: Normal rate.   Pulmonary:      Effort: Pulmonary effort is normal.   Musculoskeletal:      Right hip: He exhibits decreased range of motion and tenderness. He exhibits normal strength, no bony tenderness, no swelling, no crepitus, no deformity and no laceration.      Left hip: He exhibits normal range of motion, normal strength, no tenderness, no bony tenderness, no swelling, no crepitus, no deformity and no laceration.        Legs:       Comments: Patient has no evidence of swelling ecchymosis or spasm about his right hip and  low back and right thigh.  He has complaints of pain with palpation about his right lower back radiating to his right buttock and thigh.  He has minimal pain with forward flexion of his lower back to approximately 90°, extension to 10°, and lateral bending and rotation of 45°.    Patient also has complaints of pain with flexion of his right hip to 45°, abduction to 45° and extension to 25°.  He has pain with internal and external rotation of his right hip.  Pain in right hip is not severe and does not cause any other complaints of numbness, tingling or weakness.  Distal pulses right lower extremity are equal and intact.   Skin:     General: Skin is warm and dry.   Neurological:      Mental Status: He is alert and oriented to person, place, and time.        Xr Hip 2 View Left    Result Date: 11/19/2020  EXAMINATION: XR HIP 2 VIEW LEFT CLINICAL HISTORY: Pain in left hip TECHNIQUE: AP view of the pelvis and frog leg lateral view of the left hip were performed. COMPARISON: None FINDINGS: There is subtle cortical irregularity which may represent acute nondisplaced fracture is seen involving the right superior pubic ramus.  Correlate for trauma and tenderness in this region.  No additional acute fractures or dislocation seen.  Joint spaces are maintained.     Suspected acute fracture of the right superior pubic ramus. Electronically signed by: Janusz Valverde MD Date:    11/19/2020 Time:    18:21    Xr Femur 2 View Right    Result Date: 11/19/2020  EXAMINATION: XR FEMUR 2 VIEW RIGHT CLINICAL HISTORY: Pain in right thigh TECHNIQUE: AP and lateral views of the right femur were performed. COMPARISON: Concurrent pelvic and left hip radiographs. FINDINGS: No evidence of acute displaced fracture or dislocation involving the right femur.  Questionable fracture of the right superior pubic ramus better appreciated on concurrent pelvic/left hip radiograph.     See above. Electronically signed by: Janusz Valverde MD Date:    11/19/2020 Time:    18:22  Assessment:       1. Closed fracture of superior ramus of right pubis with routine healing, subsequent encounter    2. Contusion of right thigh, subsequent encounter        Plan:       Discussed results of x-rays done on 11/19/2020 and again today which revealed routine healing of the subtle fracture of the right pubic ramus.  Patient is aware that he needs to continue warm soaks with gentle range-of-motion exercises daily.  He will continue taking ibuprofen 800 mg or over-the-counter anti-inflammatory medication as needed.     Patient Instructions: Daily home exercises/warm soaks   Restrictions: No lifting/pushing/pulling more than 10 lbs, Sit or stand as needed, Avoid climbing/kneeling/squatting  Follow up in about 1 week (around  12/15/2020).

## 2020-12-28 ENCOUNTER — OFFICE VISIT (OUTPATIENT)
Dept: URGENT CARE | Facility: CLINIC | Age: 25
End: 2020-12-28
Payer: OTHER MISCELLANEOUS

## 2020-12-28 DIAGNOSIS — S32.511D CLOSED FRACTURE OF SUPERIOR RAMUS OF RIGHT PUBIS WITH ROUTINE HEALING, SUBSEQUENT ENCOUNTER: Primary | ICD-10-CM
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
## 2020-12-28 DIAGNOSIS — S70.11XD CONTUSION OF RIGHT THIGH, SUBSEQUENT ENCOUNTER: ICD-10-CM
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
## 2020-12-28 PROCEDURE — 99214 PR OFFICE/OUTPT VISIT, EST, LEVL IV, 30-39 MIN: ICD-10-PCS | Mod: S$GLB,,, | Performed by: EMERGENCY MEDICINE

## 2020-12-28 PROCEDURE — 99214 OFFICE O/P EST MOD 30 MIN: CPT | Mod: S$GLB,,, | Performed by: EMERGENCY MEDICINE

## 2020-12-28 NOTE — PROGRESS NOTES
Subjective:       Patient ID: Burton Alvarado is a 25 y.o. male.    Chief Complaint: Hip Pain    RV, Right hip fracture (DOI 11/19/2020) [employer]- Patient states he all the way better. He is not in any pain and states that he is no longer taking any medication.  Pain is a 1/10. IJ    PATIENT'S INITIAL INJURY WAS NOVEMBER 19TH ABOUT 5 WEEKS AGO.  HE SUSTAINED A RIGHT SUPERIOR PUBIC RAMUS FRACTURE.  PAIN IS MINIMAL AND HE HAS BEEN DOING WELL WITH LIGHT DUTY.  HE HAS NOT NEEDED MEDICATION.  GIVEN THAT THERE IS A FRACTURE WILL KEEP LIGHT DUTY FOR ANOTHER 2 WEEKS AND THEN IF STILL DOING WELL AT THAT POINT WILL RELEASED FROM OCCUPATIONAL HEALTH AT REGULAR DUTY.  NO NEW INJURY NO NEW PAIN PATIENT AMBULATORY WITHOUT DIFFICULTY AND WITHOUT ASSISTANCE.    Hip Injury  This is a recurrent problem. The current episode started more than 1 month ago. The problem occurs daily. The problem has been gradually improving. Pertinent negatives include no abdominal pain, anorexia, arthralgias, change in bowel habit, chest pain, chills, congestion, coughing, diaphoresis, fatigue, fever, headaches, joint swelling, myalgias, nausea, neck pain, rash, sore throat, swollen glands, urinary symptoms, vertigo, visual change, vomiting or weakness. The symptoms are aggravated by walking (sitting). He has tried NSAIDs for the symptoms. The treatment provided moderate relief.       Constitution: Negative for chills, sweating, fatigue and fever.   HENT: Negative for facial swelling, facial trauma, congestion and sore throat.    Neck: Negative for neck pain and neck stiffness.   Cardiovascular: Negative for chest trauma and chest pain.   Eyes: Negative for eye trauma, double vision and blurred vision.   Respiratory: Negative for cough.    Gastrointestinal: Negative for abdominal trauma, abdominal pain, nausea, vomiting and rectal bleeding.   Endocrine: negative.   Genitourinary: Negative for hematuria, genital trauma and pelvic pain.   Musculoskeletal:  Positive for pain. Negative for trauma, joint pain, joint swelling, abnormal ROM of joint, pain with walking and muscle ache.   Skin: Negative for color change, rash, wound, abrasion and laceration.   Allergic/Immunologic: Negative.    Neurological: Negative for dizziness, history of vertigo, light-headedness, coordination disturbances, headaches, altered mental status and loss of consciousness.   Hematologic/Lymphatic: Negative for history of bleeding disorder.   Psychiatric/Behavioral: Negative for altered mental status.        Objective:      Physical Exam  Vitals signs and nursing note reviewed.   Constitutional:       Appearance: Normal appearance. He is well-developed.   HENT:      Head: Normocephalic and atraumatic.      Right Ear: Tympanic membrane normal.      Left Ear: Tympanic membrane normal.   Eyes:      Pupils: Pupils are equal, round, and reactive to light.   Neck:      Musculoskeletal: Normal range of motion.   Cardiovascular:      Rate and Rhythm: Normal rate.   Pulmonary:      Effort: Pulmonary effort is normal.   Musculoskeletal:      Right hip: He exhibits normal range of motion, normal strength, no tenderness, no bony tenderness, no swelling, no crepitus, no deformity and no laceration.      Left hip: He exhibits normal range of motion, normal strength, no tenderness, no bony tenderness, no swelling, no crepitus, no deformity and no laceration.        Legs:       Comments: Patient has no evidence of swelling ecchymosis or spasm about his right hip and  low back and right thigh.  He has complaints of pain with palpation about his right lower back radiating to his right buttock and thigh.  He has minimal pain with forward flexion of his lower back to approximately 90°, extension to 10°, and lateral bending and rotation of 45°.    Patient also has complaints of pain with flexion of his right hip to 45°, abduction to 45° and extension to 25°.  He has pain with internal and external rotation of his  right hip.  Pain in right hip is not severe and does not cause any other complaints of numbness, tingling or weakness.  Distal pulses right lower extremity are equal and intact.   Skin:     General: Skin is warm and dry.   Neurological:      Mental Status: He is alert and oriented to person, place, and time.         Assessment:       1. Closed fracture of superior ramus of right pubis with routine healing, subsequent encounter    2. Contusion of right thigh, subsequent encounter    3. Fall, subsequent encounter    4. Work related injury        Plan:       WILL KEEP RESTRICTIONS IN PLACE SO THAT TOTAL TIME OF HEALING HAS BEEN 6-7 WEEKS AFTER A NONDISPLACED SUPERIOR PUBIC RAMUS FRACTURE.  HE IS AMBULATORY WITHOUT DIFFICULTY, HAS BEEN FUNCTIONING WELL WITH RESTRICTIONS IN PLACE AT WORK, NOT REQUIRING MEDICATIONS.  IF STILL DOING WELL AT NEXT VISIT IN 2 WEEKS, WILL DISCHARGE HOME OCCUPATIONAL HEALTH.     Patient Instructions: Attention not to aggravate affected area, Daily home exercises/warm soaks   Restrictions: Sit or stand as needed, Avoid climbing/kneeling/squatting, Avoid frequent bending/lifting/twisting, No lifting/pushing/pulling more than 10 lbs, No Prolonged standing/walking  Follow up in about 2 weeks (around 1/11/2021).

## 2020-12-28 NOTE — LETTER
[facility]  [address] [place] [address]  Phone: [phone]  Fax: [phone]  [facility]: [phone]    Pt Name: Burton Alvarado  Injury Date: 11/19/2020   Employee ID:  Date of First Treatment: 11/20/20   Company: [employer] reference to record [id number]      Appointment Time: 08:45 AM Arrived: 11:00   Provider: Sony Bonilla MD Time Out:12:00     Office Treatment:   1. Closed fracture of superior ramus of right pubis with routine healing, subsequent encounter    2. Contusion of right thigh, subsequent encounter    3. Fall, subsequent encounter    4. Work related injury          Patient Instructions: Attention not to aggravate affected area, Daily home exercises/warm soaks    Restrictions: Sit or stand as needed, Avoid climbing/kneeling/squatting, Avoid frequent bending/lifting/twisting, No lifting/pushing/pulling more than 10 lbs, No Prolonged standing/walking     Return Appointment: 1/11/2021  11:00